# Patient Record
Sex: MALE | Race: WHITE | NOT HISPANIC OR LATINO | Employment: FULL TIME | URBAN - METROPOLITAN AREA
[De-identification: names, ages, dates, MRNs, and addresses within clinical notes are randomized per-mention and may not be internally consistent; named-entity substitution may affect disease eponyms.]

---

## 2017-08-30 ENCOUNTER — OFFICE VISIT (OUTPATIENT)
Dept: FAMILY MEDICINE CLINIC | Facility: CLINIC | Age: 47
End: 2017-08-30

## 2017-08-30 VITALS
OXYGEN SATURATION: 98 % | DIASTOLIC BLOOD PRESSURE: 68 MMHG | BODY MASS INDEX: 29.78 KG/M2 | TEMPERATURE: 97.9 F | HEIGHT: 70 IN | WEIGHT: 208 LBS | SYSTOLIC BLOOD PRESSURE: 112 MMHG | HEART RATE: 60 BPM

## 2017-08-30 DIAGNOSIS — G51.0 LEFT-SIDED BELL'S PALSY: Primary | ICD-10-CM

## 2017-08-30 PROCEDURE — 99213 OFFICE O/P EST LOW 20 MIN: CPT | Performed by: NURSE PRACTITIONER

## 2017-08-30 RX ORDER — PREDNISONE 20 MG/1
20 TABLET ORAL 2 TIMES DAILY
Qty: 10 TABLET | Refills: 0 | Status: SHIPPED | OUTPATIENT
Start: 2017-08-30 | End: 2017-09-29

## 2017-08-30 NOTE — PROGRESS NOTES
"Subjective   Damion Canela is a 47 y.o. male who presents c/o numbness in tongue and on left side of face since this morning. Feels like bell's palsy.     History of Present Illness   Mouth felt funny yesterday, but eyelid droop since this morning. Had bells palsy distantly and feels similar. No trouble swallowing or speaking. No unilateral weakness, mild headache.   The following portions of the patient's history were reviewed and updated as appropriate: allergies, current medications, past family history, past medical history, past social history, past surgical history and problem list.    Review of Systems   Constitutional: Negative.    Eyes: Negative.    Respiratory: Negative.    Cardiovascular: Negative.    Musculoskeletal: Negative.    Neurological: Positive for numbness and headaches. Negative for dizziness, syncope, facial asymmetry and weakness.   Psychiatric/Behavioral: Negative.  Negative for behavioral problems and confusion.     /68  Pulse 60  Temp 97.9 °F (36.6 °C) (Oral)   Ht 70\" (177.8 cm)  Wt 208 lb (94.3 kg)  SpO2 98%  BMI 29.84 kg/m2    Objective   Physical Exam   Constitutional: He is oriented to person, place, and time. He appears well-developed and well-nourished.   HENT:   Right Ear: Tympanic membrane normal.   Left Ear: Tympanic membrane normal.   Eyes: Conjunctivae and EOM are normal. Pupils are equal, round, and reactive to light.   Incomplete lid closure L   Neck: Normal range of motion and full passive range of motion without pain. Neck supple. No edema and normal range of motion present. No Brudzinski's sign and no Kernig's sign noted. No thyroid mass and no thyromegaly present.   Cardiovascular: Normal rate, regular rhythm and normal heart sounds.    Pulmonary/Chest: Effort normal and breath sounds normal.   Musculoskeletal:        Right upper arm: Normal.        Left upper arm: Normal.        Right upper leg: Normal.        Left upper leg: Normal.   Neurological: He is " alert and oriented to person, place, and time. A cranial nerve deficit (VII) is present. He exhibits normal muscle tone. Coordination normal.   Skin: Skin is warm and dry.   Psychiatric: He has a normal mood and affect. His behavior is normal. Judgment and thought content normal.   Nursing note and vitals reviewed.    Assessment/Plan   Problems Addressed this Visit     None      Visit Diagnoses     Left-sided Bell's palsy    -  Primary    Relevant Medications    predniSONE (DELTASONE) 20 MG tablet    Propylene Glycol 0.6 % solution        To patch eye at night, Discussed more serious signs of stroke, difficulty speaking, swallowing, unilateral weakness, none present today. To ER if develops any. Pt agrees.

## 2017-09-29 ENCOUNTER — OFFICE VISIT (OUTPATIENT)
Dept: FAMILY MEDICINE CLINIC | Facility: CLINIC | Age: 47
End: 2017-09-29

## 2017-09-29 VITALS
TEMPERATURE: 98.2 F | HEART RATE: 72 BPM | OXYGEN SATURATION: 98 % | DIASTOLIC BLOOD PRESSURE: 76 MMHG | BODY MASS INDEX: 28.2 KG/M2 | HEIGHT: 70 IN | WEIGHT: 197 LBS | SYSTOLIC BLOOD PRESSURE: 124 MMHG

## 2017-09-29 DIAGNOSIS — Z23 NEED FOR IMMUNIZATION AGAINST INFLUENZA: ICD-10-CM

## 2017-09-29 DIAGNOSIS — G51.0 BELL'S PALSY: Primary | ICD-10-CM

## 2017-09-29 DIAGNOSIS — R25.2 MUSCLE CRAMPS: ICD-10-CM

## 2017-09-29 PROCEDURE — 90686 IIV4 VACC NO PRSV 0.5 ML IM: CPT | Performed by: NURSE PRACTITIONER

## 2017-09-29 PROCEDURE — 90471 IMMUNIZATION ADMIN: CPT | Performed by: NURSE PRACTITIONER

## 2017-09-29 PROCEDURE — 99213 OFFICE O/P EST LOW 20 MIN: CPT | Performed by: NURSE PRACTITIONER

## 2017-09-29 RX ORDER — NAPROXEN 500 MG/1
500 TABLET ORAL AS NEEDED
Qty: 60 TABLET | Refills: 5 | Status: SHIPPED | OUTPATIENT
Start: 2017-09-29 | End: 2019-02-19 | Stop reason: SDUPTHER

## 2017-09-29 NOTE — PROGRESS NOTES
"Subjective   Damion Canela is a 47 y.o. male who presents for a follow up on bells palsy.     History of Present Illness   Eye twitches and tearing when active, otherwise able to get lid fully closed now. Having some swelling to L side of face and thinks dental issues, but wanted checked as bell's palsy affected this side. No other concerns.   The following portions of the patient's history were reviewed and updated as appropriate: allergies, current medications, past family history, past medical history, past social history, past surgical history and problem list.    Review of Systems   Constitutional: Negative.  Negative for activity change and unexpected weight change.   HENT: Negative.    Eyes: Negative.  Negative for photophobia, pain, discharge, redness and visual disturbance.        Twitching   Respiratory: Negative.    Cardiovascular: Negative.  Negative for chest pain.   Gastrointestinal: Negative.  Negative for constipation and diarrhea.   Endocrine: Negative.    Genitourinary: Negative for difficulty urinating and frequency.   Musculoskeletal: Positive for myalgias.   Neurological: Positive for facial asymmetry. Negative for dizziness, tremors, seizures, syncope, speech difficulty, weakness, light-headedness, numbness and headaches.   Hematological: Negative.    Psychiatric/Behavioral: Negative.  Negative for dysphoric mood.     /76  Pulse 72  Temp 98.2 °F (36.8 °C) (Oral)   Ht 70\" (177.8 cm)  Wt 197 lb (89.4 kg)  SpO2 98%  BMI 28.27 kg/m2    Objective   Physical Exam   Constitutional: He appears well-developed and well-nourished.   HENT:   Head: Normocephalic and atraumatic.   Mouth/Throat: Oropharynx is clear and moist and mucous membranes are normal. He has dentures (upper plate). No oral lesions. Abnormal dentition. No dental abscesses, uvula swelling or dental caries.       Neck: Normal range of motion. Neck supple. No thyromegaly present.   Cardiovascular: Normal rate, regular rhythm " and normal heart sounds.    Pulses:       Carotid pulses are 2+ on the right side, and 2+ on the left side.  Pulmonary/Chest: Effort normal and breath sounds normal.   Lymphadenopathy:     He has no cervical adenopathy.   Neurological: A cranial nerve deficit (faint R lid droop, full lid closure today, smile near equal) is present.   Skin: Skin is warm and dry. He is not diaphoretic.   Psychiatric: He has a normal mood and affect. His behavior is normal. Judgment and thought content normal.   Nursing note and vitals reviewed.    Assessment/Plan   Problems Addressed this Visit     None      Visit Diagnoses     Bell's palsy    -  Primary    Muscle cramps        Relevant Medications    naproxen (NAPROSYN) 500 MG tablet    Need for immunization against influenza        Relevant Orders    Flu Vaccine Quad PF 3YR+        [place] palsy should continue to settle, greatly improved today. Recommend vaccination for flu, as new grandchild, agrees. Reasonable to continue prn naproxen. Fu if insufficient control. Recommend dentist to address exposed nerve root/ tooth pain. Cholesterol normal in 2015

## 2018-11-21 ENCOUNTER — HOSPITAL ENCOUNTER (OUTPATIENT)
Dept: CARDIOLOGY | Facility: HOSPITAL | Age: 48
Discharge: HOME | End: 2018-11-21
Attending: ORTHOPAEDIC SURGERY | Admitting: ORTHOPAEDIC SURGERY

## 2018-11-21 ENCOUNTER — HOSPITAL ENCOUNTER (OUTPATIENT)
Dept: GENERAL RADIOLOGY | Facility: HOSPITAL | Age: 48
Discharge: HOME | End: 2018-11-21
Attending: ORTHOPAEDIC SURGERY

## 2018-11-21 ENCOUNTER — LAB (OUTPATIENT)
Dept: LAB | Facility: HOSPITAL | Age: 48
End: 2018-11-21
Attending: ORTHOPAEDIC SURGERY

## 2018-11-21 ENCOUNTER — TRANSCRIBE ORDERS (OUTPATIENT)
Dept: ADMINISTRATIVE | Facility: HOSPITAL | Age: 48
End: 2018-11-21

## 2018-11-21 DIAGNOSIS — Z87.828 HISTORY OF MENISCAL TEAR: ICD-10-CM

## 2018-11-21 DIAGNOSIS — Z01.818 PREOP EXAMINATION: ICD-10-CM

## 2018-11-21 DIAGNOSIS — Z87.828 HISTORY OF MENISCAL TEAR: Primary | ICD-10-CM

## 2018-11-21 DIAGNOSIS — Z01.811 PRE-OP CHEST EXAM: ICD-10-CM

## 2018-11-21 LAB
ANION GAP SERPL CALCULATED.3IONS-SCNC: 8.8 MMOL/L
BUN BLD-MCNC: 12 MG/DL (ref 6–20)
BUN/CREAT SERPL: 15.4 (ref 7–25)
CALCIUM SPEC-SCNC: 9.2 MG/DL (ref 8.6–10.5)
CHLORIDE SERPL-SCNC: 105 MMOL/L (ref 98–107)
CO2 SERPL-SCNC: 29.2 MMOL/L (ref 22–29)
CREAT BLD-MCNC: 0.78 MG/DL (ref 0.76–1.27)
DEPRECATED RDW RBC AUTO: 44.3 FL (ref 37–54)
ERYTHROCYTE [DISTWIDTH] IN BLOOD BY AUTOMATED COUNT: 13.7 % (ref 11.5–14.5)
GFR SERPL CREATININE-BSD FRML MDRD: 105 ML/MIN/1.73
GLUCOSE BLD-MCNC: 94 MG/DL (ref 65–99)
HCT VFR BLD AUTO: 42.8 % (ref 40.4–52.2)
HGB BLD-MCNC: 14.2 G/DL (ref 13.7–17.6)
MCH RBC QN AUTO: 29.1 PG (ref 27–32.7)
MCHC RBC AUTO-ENTMCNC: 33.2 G/DL (ref 32.6–36.4)
MCV RBC AUTO: 87.7 FL (ref 79.8–96.2)
PLATELET # BLD AUTO: 190 10*3/MM3 (ref 140–500)
PMV BLD AUTO: 9.6 FL (ref 6–12)
POTASSIUM BLD-SCNC: 4.6 MMOL/L (ref 3.5–5.2)
RBC # BLD AUTO: 4.88 10*6/MM3 (ref 4.6–6)
SODIUM BLD-SCNC: 143 MMOL/L (ref 136–145)
WBC NRBC COR # BLD: 8.81 10*3/MM3 (ref 4.5–10.7)

## 2018-11-21 PROCEDURE — 93010 ELECTROCARDIOGRAM REPORT: CPT | Performed by: INTERNAL MEDICINE

## 2018-11-21 PROCEDURE — 71046 X-RAY EXAM CHEST 2 VIEWS: CPT

## 2018-11-21 PROCEDURE — 85027 COMPLETE CBC AUTOMATED: CPT

## 2018-11-21 PROCEDURE — 36415 COLL VENOUS BLD VENIPUNCTURE: CPT

## 2018-11-21 PROCEDURE — 93005 ELECTROCARDIOGRAM TRACING: CPT | Performed by: ORTHOPAEDIC SURGERY

## 2018-11-21 PROCEDURE — 80048 BASIC METABOLIC PNL TOTAL CA: CPT

## 2019-02-19 DIAGNOSIS — R25.2 MUSCLE CRAMPS: ICD-10-CM

## 2019-02-19 RX ORDER — NAPROXEN 500 MG/1
TABLET ORAL
Qty: 28 TABLET | Refills: 12 | Status: SHIPPED | OUTPATIENT
Start: 2019-02-19

## 2024-05-29 ENCOUNTER — APPOINTMENT (OUTPATIENT)
Dept: CT IMAGING | Facility: HOSPITAL | Age: 54
End: 2024-05-29
Payer: COMMERCIAL

## 2024-05-29 ENCOUNTER — HOSPITAL ENCOUNTER (EMERGENCY)
Facility: HOSPITAL | Age: 54
Discharge: HOME | End: 2024-05-29
Attending: EMERGENCY MEDICINE
Payer: COMMERCIAL

## 2024-05-29 ENCOUNTER — APPOINTMENT (OUTPATIENT)
Dept: GENERAL RADIOLOGY | Facility: HOSPITAL | Age: 54
End: 2024-05-29
Payer: COMMERCIAL

## 2024-05-29 VITALS
SYSTOLIC BLOOD PRESSURE: 131 MMHG | TEMPERATURE: 97.8 F | HEIGHT: 70 IN | DIASTOLIC BLOOD PRESSURE: 89 MMHG | BODY MASS INDEX: 27.2 KG/M2 | HEART RATE: 61 BPM | RESPIRATION RATE: 16 BRPM | WEIGHT: 190 LBS | OXYGEN SATURATION: 97 %

## 2024-05-29 DIAGNOSIS — R07.9 CHEST PAIN, UNSPECIFIED TYPE: Primary | ICD-10-CM

## 2024-05-29 LAB
ALBUMIN SERPL-MCNC: 4 G/DL (ref 3.5–5.2)
ALBUMIN/GLOB SERPL: 2.2 G/DL
ALP SERPL-CCNC: 95 U/L (ref 39–117)
ALT SERPL W P-5'-P-CCNC: 9 U/L (ref 1–41)
ANION GAP SERPL CALCULATED.3IONS-SCNC: 8.8 MMOL/L (ref 5–15)
AST SERPL-CCNC: 13 U/L (ref 1–40)
BASOPHILS # BLD AUTO: 0.06 10*3/MM3 (ref 0–0.2)
BASOPHILS NFR BLD AUTO: 0.6 % (ref 0–1.5)
BILIRUB SERPL-MCNC: 0.4 MG/DL (ref 0–1.2)
BUN SERPL-MCNC: 12 MG/DL (ref 6–20)
BUN/CREAT SERPL: 19 (ref 7–25)
CALCIUM SPEC-SCNC: 8.5 MG/DL (ref 8.6–10.5)
CHLORIDE SERPL-SCNC: 109 MMOL/L (ref 98–107)
CO2 SERPL-SCNC: 22.2 MMOL/L (ref 22–29)
CREAT SERPL-MCNC: 0.63 MG/DL (ref 0.76–1.27)
D DIMER PPP FEU-MCNC: 0.8 MCGFEU/ML (ref 0–0.56)
DEPRECATED RDW RBC AUTO: 43.1 FL (ref 37–54)
EGFRCR SERPLBLD CKD-EPI 2021: 111.6 ML/MIN/1.73
EOSINOPHIL # BLD AUTO: 0.13 10*3/MM3 (ref 0–0.4)
EOSINOPHIL NFR BLD AUTO: 1.4 % (ref 0.3–6.2)
ERYTHROCYTE [DISTWIDTH] IN BLOOD BY AUTOMATED COUNT: 13.1 % (ref 12.3–15.4)
GEN 5 2HR TROPONIN T REFLEX: 7 NG/L
GLOBULIN UR ELPH-MCNC: 1.8 GM/DL
GLUCOSE SERPL-MCNC: 105 MG/DL (ref 65–99)
HCT VFR BLD AUTO: 41.4 % (ref 37.5–51)
HGB BLD-MCNC: 13.7 G/DL (ref 13–17.7)
HOLD SPECIMEN: NORMAL
HOLD SPECIMEN: NORMAL
IMM GRANULOCYTES # BLD AUTO: 0.04 10*3/MM3 (ref 0–0.05)
IMM GRANULOCYTES NFR BLD AUTO: 0.4 % (ref 0–0.5)
LYMPHOCYTES # BLD AUTO: 2.82 10*3/MM3 (ref 0.7–3.1)
LYMPHOCYTES NFR BLD AUTO: 30 % (ref 19.6–45.3)
MCH RBC QN AUTO: 29.8 PG (ref 26.6–33)
MCHC RBC AUTO-ENTMCNC: 33.1 G/DL (ref 31.5–35.7)
MCV RBC AUTO: 90 FL (ref 79–97)
MONOCYTES # BLD AUTO: 0.79 10*3/MM3 (ref 0.1–0.9)
MONOCYTES NFR BLD AUTO: 8.4 % (ref 5–12)
NEUTROPHILS NFR BLD AUTO: 5.55 10*3/MM3 (ref 1.7–7)
NEUTROPHILS NFR BLD AUTO: 59.2 % (ref 42.7–76)
NRBC BLD AUTO-RTO: 0 /100 WBC (ref 0–0.2)
PLATELET # BLD AUTO: 172 10*3/MM3 (ref 140–450)
PMV BLD AUTO: 8.9 FL (ref 6–12)
POTASSIUM SERPL-SCNC: 3.8 MMOL/L (ref 3.5–5.2)
PROT SERPL-MCNC: 5.8 G/DL (ref 6–8.5)
QT INTERVAL: 446 MS
QTC INTERVAL: 471 MS
RBC # BLD AUTO: 4.6 10*6/MM3 (ref 4.14–5.8)
SODIUM SERPL-SCNC: 140 MMOL/L (ref 136–145)
TROPONIN T DELTA: 1 NG/L
TROPONIN T SERPL HS-MCNC: 6 NG/L
WBC NRBC COR # BLD AUTO: 9.39 10*3/MM3 (ref 3.4–10.8)
WHOLE BLOOD HOLD COAG: NORMAL
WHOLE BLOOD HOLD SPECIMEN: NORMAL

## 2024-05-29 PROCEDURE — 84484 ASSAY OF TROPONIN QUANT: CPT | Performed by: EMERGENCY MEDICINE

## 2024-05-29 PROCEDURE — 36415 COLL VENOUS BLD VENIPUNCTURE: CPT

## 2024-05-29 PROCEDURE — 99285 EMERGENCY DEPT VISIT HI MDM: CPT

## 2024-05-29 PROCEDURE — 71275 CT ANGIOGRAPHY CHEST: CPT

## 2024-05-29 PROCEDURE — 80053 COMPREHEN METABOLIC PANEL: CPT | Performed by: EMERGENCY MEDICINE

## 2024-05-29 PROCEDURE — 25510000001 IOPAMIDOL PER 1 ML: Performed by: EMERGENCY MEDICINE

## 2024-05-29 PROCEDURE — 85379 FIBRIN DEGRADATION QUANT: CPT | Performed by: EMERGENCY MEDICINE

## 2024-05-29 PROCEDURE — 93005 ELECTROCARDIOGRAM TRACING: CPT | Performed by: EMERGENCY MEDICINE

## 2024-05-29 PROCEDURE — 93005 ELECTROCARDIOGRAM TRACING: CPT

## 2024-05-29 PROCEDURE — 85025 COMPLETE CBC W/AUTO DIFF WBC: CPT | Performed by: EMERGENCY MEDICINE

## 2024-05-29 PROCEDURE — 71045 X-RAY EXAM CHEST 1 VIEW: CPT

## 2024-05-29 PROCEDURE — 93010 ELECTROCARDIOGRAM REPORT: CPT | Performed by: INTERNAL MEDICINE

## 2024-05-29 RX ORDER — ASPIRIN 325 MG
325 TABLET ORAL ONCE
Status: DISCONTINUED | OUTPATIENT
Start: 2024-05-29 | End: 2024-05-29

## 2024-05-29 RX ORDER — SODIUM CHLORIDE 0.9 % (FLUSH) 0.9 %
10 SYRINGE (ML) INJECTION AS NEEDED
Status: DISCONTINUED | OUTPATIENT
Start: 2024-05-29 | End: 2024-05-29 | Stop reason: HOSPADM

## 2024-05-29 RX ADMIN — Medication 10 ML: at 11:16

## 2024-05-29 RX ADMIN — IOPAMIDOL 95 ML: 755 INJECTION, SOLUTION INTRAVENOUS at 12:32

## 2024-05-29 NOTE — ED NOTES
"Pt to ED from work, states he got \"mad\" then began having tightness to L anterior chest. Better after ASA per EMS. 1 SL nitro pta states but did not help much.   Denies cardiac hx.   "

## 2024-05-29 NOTE — ED NOTES
Patient to ER via ems from work for sudden onset of chest pain that was sharp has moved to middle of chest and is more of a pressure    324 asprin helped 1 nitro did not help  No heart hx

## 2024-05-29 NOTE — ED PROVIDER NOTES
EMERGENCY DEPARTMENT ENCOUNTER    Room Number:  20/20  PCP: Fito Pedro DO  Historian: Patient      HPI:  Chief Complaint: Chest pain  A complete HPI/ROS/PMH/PSH/SH/FH are unobtainable due to: None  Context: Damion Canela is a 54 y.o. male who presents to the ED c/o chest pain.  Patient has no history of heart disease in the past.  No diabetes high blood pressure.  Patient is a smoker.  Patient states he got angry at work.  States got upset about 9 AM.  States he had discomfort in his chest shortness of breath and diaphoresis.  Patient states would rated as 8 out of 10 and now down to 3-10.  Has had no leg swelling.  Has not noted exertional symptoms.  No fevers or chills.            PAST MEDICAL HISTORY  Active Ambulatory Problems     Diagnosis Date Noted    Fatigue 05/19/2016    Charcot's syndrome 05/19/2016    Leg cramp 05/19/2016     Resolved Ambulatory Problems     Diagnosis Date Noted    No Resolved Ambulatory Problems     No Additional Past Medical History         PAST SURGICAL HISTORY  Past Surgical History:   Procedure Laterality Date    SHOULDER SURGERY Left     TRIGGER FINGER RELEASE Left          FAMILY HISTORY  Family History   Problem Relation Age of Onset    Congenital heart disease Mother     Cancer Sister     HIV Brother          SOCIAL HISTORY  Social History     Socioeconomic History    Marital status:    Tobacco Use    Smoking status: Every Day     Current packs/day: 1.00     Types: Cigarettes    Smokeless tobacco: Never   Substance and Sexual Activity    Alcohol use: Yes     Comment: weekends    Drug use: No         ALLERGIES  Patient has no known allergies.        REVIEW OF SYSTEMS  Review of Systems   Chest pain shortness of breath      PHYSICAL EXAM  ED Triage Vitals   Temp Heart Rate Resp BP SpO2   05/29/24 1031 05/29/24 1031 05/29/24 1031 05/29/24 1031 05/29/24 1031   97.8 °F (36.6 °C) 74 16 120/81 96 %      Temp src Heart Rate Source Patient Position BP Location FiO2 (%)    -- -- 05/29/24 1106 -- --     Lying         Physical Exam      GENERAL: no acute distress  HENT: nares patent  EYES: no scleral icterus  CV: regular rhythm, normal rate  RESPIRATORY: normal effort  ABDOMEN: soft  MUSCULOSKELETAL: no deformity  NEURO: alert, moves all extremities, follows commands  PSYCH:  calm, cooperative  SKIN: warm, dry    Vital signs and nursing notes reviewed.          LAB RESULTS  Recent Results (from the past 24 hour(s))   ECG 12 Lead ED Triage Standing Order; Chest Pain    Collection Time: 05/29/24 11:03 AM   Result Value Ref Range    QT Interval 446 ms    QTC Interval 471 ms   Comprehensive Metabolic Panel    Collection Time: 05/29/24 11:16 AM    Specimen: Blood   Result Value Ref Range    Glucose 105 (H) 65 - 99 mg/dL    BUN 12 6 - 20 mg/dL    Creatinine 0.63 (L) 0.76 - 1.27 mg/dL    Sodium 140 136 - 145 mmol/L    Potassium 3.8 3.5 - 5.2 mmol/L    Chloride 109 (H) 98 - 107 mmol/L    CO2 22.2 22.0 - 29.0 mmol/L    Calcium 8.5 (L) 8.6 - 10.5 mg/dL    Total Protein 5.8 (L) 6.0 - 8.5 g/dL    Albumin 4.0 3.5 - 5.2 g/dL    ALT (SGPT) 9 1 - 41 U/L    AST (SGOT) 13 1 - 40 U/L    Alkaline Phosphatase 95 39 - 117 U/L    Total Bilirubin 0.4 0.0 - 1.2 mg/dL    Globulin 1.8 gm/dL    A/G Ratio 2.2 g/dL    BUN/Creatinine Ratio 19.0 7.0 - 25.0    Anion Gap 8.8 5.0 - 15.0 mmol/L    eGFR 111.6 >60.0 mL/min/1.73   High Sensitivity Troponin T    Collection Time: 05/29/24 11:16 AM    Specimen: Blood   Result Value Ref Range    HS Troponin T 6 <22 ng/L   Green Top (Gel)    Collection Time: 05/29/24 11:16 AM   Result Value Ref Range    Extra Tube Hold for add-ons.    Lavender Top    Collection Time: 05/29/24 11:16 AM   Result Value Ref Range    Extra Tube hold for add-on    Gold Top - SST    Collection Time: 05/29/24 11:16 AM   Result Value Ref Range    Extra Tube Hold for add-ons.    Light Blue Top    Collection Time: 05/29/24 11:16 AM   Result Value Ref Range    Extra Tube Hold for add-ons.    CBC Auto  Differential    Collection Time: 05/29/24 11:16 AM    Specimen: Blood   Result Value Ref Range    WBC 9.39 3.40 - 10.80 10*3/mm3    RBC 4.60 4.14 - 5.80 10*6/mm3    Hemoglobin 13.7 13.0 - 17.7 g/dL    Hematocrit 41.4 37.5 - 51.0 %    MCV 90.0 79.0 - 97.0 fL    MCH 29.8 26.6 - 33.0 pg    MCHC 33.1 31.5 - 35.7 g/dL    RDW 13.1 12.3 - 15.4 %    RDW-SD 43.1 37.0 - 54.0 fl    MPV 8.9 6.0 - 12.0 fL    Platelets 172 140 - 450 10*3/mm3    Neutrophil % 59.2 42.7 - 76.0 %    Lymphocyte % 30.0 19.6 - 45.3 %    Monocyte % 8.4 5.0 - 12.0 %    Eosinophil % 1.4 0.3 - 6.2 %    Basophil % 0.6 0.0 - 1.5 %    Immature Grans % 0.4 0.0 - 0.5 %    Neutrophils, Absolute 5.55 1.70 - 7.00 10*3/mm3    Lymphocytes, Absolute 2.82 0.70 - 3.10 10*3/mm3    Monocytes, Absolute 0.79 0.10 - 0.90 10*3/mm3    Eosinophils, Absolute 0.13 0.00 - 0.40 10*3/mm3    Basophils, Absolute 0.06 0.00 - 0.20 10*3/mm3    Immature Grans, Absolute 0.04 0.00 - 0.05 10*3/mm3    nRBC 0.0 0.0 - 0.2 /100 WBC   D-dimer, Quantitative    Collection Time: 05/29/24 11:16 AM    Specimen: Blood   Result Value Ref Range    D-Dimer, Quantitative 0.80 (H) 0.00 - 0.56 MCGFEU/mL   High Sensitivity Troponin T 2Hr    Collection Time: 05/29/24  1:18 PM    Specimen: Blood   Result Value Ref Range    HS Troponin T 7 <22 ng/L    Troponin T Delta 1 >=-4 - <+4 ng/L       Ordered the above labs and reviewed the results.        RADIOLOGY  CT Angiogram Chest    Result Date: 5/29/2024  CT ANGIOGRAM OF THE CHEST. MULTIPLE CORONAL, SAGITTAL, AND 3-D RECONSTRUCTIONS.  HISTORY: Chest pain.  TECHNIQUE: Radiation dose reduction techniques were utilized, including automated exposure control and exposure modulation based on body size. CT angiogram of the chest was performed following the administration of IV contrast. Coronal, sagittal, and 3-D reconstruction images were obtained.  COMPARISON: None  FINDINGS: There are mild coronary arterial calcifications.  Several small mediastinal lymph nodes are  present. There is no axillary or hilar donya enlargement. There are no intrapulmonary arterial filling defects to diagnose a pulmonary embolus. The ascending thoracic aorta measures 3.9 cm which is mildly enlarged.  There is mild pulmonary emphysema and linear subsegmental lower lobe atelectasis is present. There is no suspicious pulmonary nodule or infiltrate.      1. No evidence for pulmonary thromboembolic disease or acute abnormality. 2. Mild pulmonary emphysema. 3. Mild enlargement of ascending thoracic aorta measuring 3.9 cm. 4. Mild coronary arterial calcifications.  This report was finalized on 5/29/2024 1:19 PM by Dr. Serg Campbell M.D on Workstation: [id number]      XR Chest 1 View    Result Date: 5/29/2024  EXAMINATION: SINGLE-VIEW CHEST RADIOGRAPH  HISTORY: 54-year-old male with a history of chest pain.  FINDINGS: An upright AP portable chest radiograph was obtained. Comparison is made to a chest radiograph dated 11/21/2018. The lungs are normal in volume and are clear. The cardiomediastinal silhouette, pulmonary vasculature and osseous structures are within normal limits.      Negative chest radiograph.  This report was finalized on 5/29/2024 12:54 PM by Dr. Kwabena Eller M.D on Workstation: [id number]       Ordered the above noted radiological studies.  Chest x-ray independently interpreted by me and shows no evidence of pneumonia          PROCEDURES  Procedures      EKG          EKG time: 1103  Rhythm/Rate: Normal sinus rhythm 67  P waves and SD: Normal P waves  QRS, axis: Normal QRS  ST and T waves: Normal ST-T waves    Interpreted Contemporaneously by me, independently viewed  Unchanged compared to prior 5/29/2024      MEDICATIONS GIVEN IN ER  Medications   sodium chloride 0.9 % flush 10 mL (10 mL Intravenous Given 5/29/24 1116)   iopamidol (ISOVUE-370) 76 % injection 100 mL (95 mL Intravenous Given by Other 5/29/24 1232)                   MEDICAL DECISION MAKING, PROGRESS, and CONSULTS    All  labs have been independently reviewed by me.  All radiology studies have been reviewed by me and I have also reviewed the radiology report.   EKG's independently viewed and interpreted by me.  Discussion below represents my analysis of pertinent findings related to patient's condition, differential diagnosis, treatment plan and final disposition.      Additional sources:  - Discussed/ obtained information from independent historians: None    - External (non-ED) record review: Epic reviewed patient seen by primary care over telemedicine 2/9/2021 for COVID-19    - Chronic or social conditions impacting care: None    - Shared decision making: Discussed options with patient and he does not want to stay in the hospital.  I told him that it was my desire to keep him in the hospital and he said he does not want stay.  He understands he is to return for any concerns and will follow-up with [facility].  Patient is alert and has decision-making capacity.      Orders placed during this visit:  Orders Placed This Encounter   Procedures    XR Chest 1 View    CT Angiogram Chest    [place] Draw    Comprehensive Metabolic Panel    High Sensitivity Troponin T    CBC Auto Differential    D-dimer, Quantitative    High Sensitivity Troponin T 2Hr    NPO Diet NPO Type: Strict NPO    Undress & Gown    Continuous Pulse Oximetry    Oxygen Therapy- Nasal Cannula; Titrate 1-6 LPM Per SpO2; 90 - 95%    ECG 12 Lead ED Triage Standing Order; Chest Pain    ECG 12 Lead ED Triage Standing Order; Chest Pain    Telemetry Scan    Telemetry Scan    Telemetry Scan    Insert Peripheral IV    CBC & Differential    Green Top (Gel)    Lavender Top    Gold Top - SST    Light Blue Top         Additional orders considered but not ordered:  None        Differential diagnosis includes but is not limited to:    Cardiac chest pain versus chest wall pain versus reflux      Independent interpretation of labs, radiology studies, and discussions with  consultants:  ED Course as of 05/29/24 1506   Wed May 29, 2024   1427 14:27 EDT  Patient presents for chest pain.  Patient states that happened while he was in an argument.  Pain resolved.  Patient's EKG normal serial troponins are negative.  D-dimer positive but CT scan of the chest was negative.  Discussed options with patient and I would favor keeping him in the hospital.  Patient does not want to do this.  Patient understands there is risk to going home.  Instructed to return here for any concerns.  Patient is alert and has decision-making capacity.  Will refer him to [facility]. Heart score 3.   [SL]      ED Course User Index  [SL] Damion Smith MD                 DIAGNOSIS  Final diagnoses:   Chest pain, unspecified type         DISPOSITION  DISCHARGE    Patient discharged in stable condition.    Reviewed implications of results, diagnosis, meds, responsibility to follow up, warning signs and symptoms of possible worsening, potential complications and reasons to return to ER, including worsening symptoms    Patient/Family voiced understanding of above instructions.    Discussed plan for discharge, as there is no emergent indication for admission. Patient referred to primary care provider for BP management due to today's BP. Pt/family is agreeable and understands need for follow up and repeat testing.  Pt is aware that discharge does not mean that nothing is wrong but it indicates no emergency is present that requires admission and they must continue care with follow-up as given below or physician of their choice.     FOLLOW-UP  Fito Pedro,   [address] [facility] [address]  [facility] [address]  [phone]    Schedule an appointment as soon as possible for a visit       [facility]ARDIOLOG[facility]  [address]  [place] [facility] [address] [phone]  Schedule an appointment as soon as possible for a visit            Medication List      No changes were made to your  prescriptions during this visit.                  Latest Documented Vital Signs:  As of 15:06 EDT  BP- 131/89 HR- 61 Temp- 97.8 °F (36.6 °C) O2 sat- 97%              --    Please note that portions of this were completed with a voice recognition program.       Note Disclaimer: At [facility], we believe that sharing information builds trust and better relationships. You are receiving this note because you are receiving care at [facility] or recently visited. It is possible you will see health information before a provider has talked with you about it. This kind of information can be easy to misunderstand. To help you fully understand what it means for your health, we urge you to discuss this note with your provider.            Damion Smith MD  05/29/24 [id number]